# Patient Record
Sex: MALE | Race: WHITE | NOT HISPANIC OR LATINO | URBAN - NONMETROPOLITAN AREA
[De-identification: names, ages, dates, MRNs, and addresses within clinical notes are randomized per-mention and may not be internally consistent; named-entity substitution may affect disease eponyms.]

---

## 2022-10-10 ENCOUNTER — LAB OUTSIDE AN ENCOUNTER (OUTPATIENT)
Dept: URBAN - NONMETROPOLITAN AREA CLINIC 2 | Facility: CLINIC | Age: 72
End: 2022-10-10

## 2022-10-10 ENCOUNTER — WEB ENCOUNTER (OUTPATIENT)
Dept: URBAN - NONMETROPOLITAN AREA CLINIC 2 | Facility: CLINIC | Age: 72
End: 2022-10-10

## 2022-10-10 ENCOUNTER — OFFICE VISIT (OUTPATIENT)
Dept: URBAN - NONMETROPOLITAN AREA CLINIC 2 | Facility: CLINIC | Age: 72
End: 2022-10-10
Payer: MEDICARE

## 2022-10-10 VITALS
WEIGHT: 220 LBS | HEIGHT: 76 IN | DIASTOLIC BLOOD PRESSURE: 87 MMHG | TEMPERATURE: 97.8 F | BODY MASS INDEX: 26.79 KG/M2 | SYSTOLIC BLOOD PRESSURE: 138 MMHG | HEART RATE: 69 BPM

## 2022-10-10 DIAGNOSIS — Z12.11 COLON CANCER SCREENING: ICD-10-CM

## 2022-10-10 DIAGNOSIS — R19.4 CHANGE IN BOWEL HABIT: ICD-10-CM

## 2022-10-10 DIAGNOSIS — K61.1 RECTAL ABSCESS: ICD-10-CM

## 2022-10-10 PROBLEM — 129851009: Status: ACTIVE | Noted: 2022-10-10

## 2022-10-10 PROBLEM — 197166005: Status: ACTIVE | Noted: 2022-10-10

## 2022-10-10 PROBLEM — 305058001: Status: ACTIVE | Noted: 2022-10-10

## 2022-10-10 PROCEDURE — 99204 OFFICE O/P NEW MOD 45 MIN: CPT | Performed by: NURSE PRACTITIONER

## 2022-10-10 RX ORDER — LINACLOTIDE 145 UG/1
1 CAPSULE AT LEAST 30 MINUTES BEFORE THE FIRST MEAL OF THE DAY ON AN EMPTY STOMACH CAPSULE, GELATIN COATED ORAL ONCE A DAY
Qty: 90 CAPSULE | Refills: 3 | OUTPATIENT
Start: 2022-10-10 | End: 2023-10-05

## 2022-10-10 NOTE — PHYSICAL EXAM CHEST:
breathing is unlabored without accessory muscle use. , normal breath sounds.
no chest pain, no cough, and no shortness of breath.

## 2022-10-10 NOTE — HPI-TODAY'S VISIT:
10/10/2022 Mr. Starr is a 71-year-old male referred to us by Dr. Rosa for consultation of colorectal cancer screening and change in bowel habits.  A copy this note be sent to the referring physician.  Between 5 and 10 years ago he had a recurrent rectal abscess.  He had to have I&D x2 by Dr. Perdomo.  He states that this bothers him at times.  Recently he has had increased constipation with hard stools.  He is on Citrucel, MiraLAX, stool softeners, and he has to take a Dulcolax once a week or so.  He states his stools are very hard.  He agrees he can drink more water.  His last colonoscopy was done November 2014 by Dr. Ratliff and normal.  Today he is here for evaluation of change in bowel habits.  He agrees to low-dose Linzess, we will schedule repeat colonoscopy to evaluate his change in bowel habits and follow-up pending results.  MB

## 2022-12-08 ENCOUNTER — OFFICE VISIT (OUTPATIENT)
Dept: URBAN - NONMETROPOLITAN AREA SURGERY CENTER 1 | Facility: SURGERY CENTER | Age: 72
End: 2022-12-08

## 2022-12-08 ENCOUNTER — CLAIMS CREATED FROM THE CLAIM WINDOW (OUTPATIENT)
Dept: URBAN - NONMETROPOLITAN AREA SURGERY CENTER 1 | Facility: SURGERY CENTER | Age: 72
End: 2022-12-08
Payer: MEDICARE

## 2022-12-08 DIAGNOSIS — R19.4 ALTERATION IN BOWEL ELIMINATION: ICD-10-CM

## 2022-12-08 PROCEDURE — 45378 DIAGNOSTIC COLONOSCOPY: CPT | Performed by: INTERNAL MEDICINE

## 2022-12-08 PROCEDURE — G8907 PT DOC NO EVENTS ON DISCHARG: HCPCS | Performed by: INTERNAL MEDICINE

## 2024-04-23 ENCOUNTER — OV EP (OUTPATIENT)
Dept: URBAN - NONMETROPOLITAN AREA CLINIC 2 | Facility: CLINIC | Age: 74
End: 2024-04-23
Payer: MEDICARE

## 2024-04-23 ENCOUNTER — LAB (OUTPATIENT)
Dept: URBAN - NONMETROPOLITAN AREA CLINIC 2 | Facility: CLINIC | Age: 74
End: 2024-04-23

## 2024-04-23 VITALS
SYSTOLIC BLOOD PRESSURE: 153 MMHG | DIASTOLIC BLOOD PRESSURE: 89 MMHG | HEART RATE: 69 BPM | TEMPERATURE: 98 F | BODY MASS INDEX: 28.01 KG/M2 | HEIGHT: 76 IN | WEIGHT: 230 LBS

## 2024-04-23 DIAGNOSIS — K61.1 RECTAL ABSCESS: ICD-10-CM

## 2024-04-23 DIAGNOSIS — R93.5 ABNORMAL ABDOMINAL CT SCAN: ICD-10-CM

## 2024-04-23 DIAGNOSIS — Z12.11 COLON CANCER SCREENING: ICD-10-CM

## 2024-04-23 DIAGNOSIS — R19.4 CHANGE IN BOWEL HABIT: ICD-10-CM

## 2024-04-23 DIAGNOSIS — K59.09 CHRONIC CONSTIPATION: ICD-10-CM

## 2024-04-23 PROBLEM — 236069009: Status: ACTIVE | Noted: 2024-04-23

## 2024-04-23 PROBLEM — 15634181000119107: Status: ACTIVE | Noted: 2024-04-23

## 2024-04-23 PROCEDURE — 99214 OFFICE O/P EST MOD 30 MIN: CPT | Performed by: INTERNAL MEDICINE

## 2024-04-23 RX ORDER — ATORVASTATIN CALCIUM 40 MG/1
1 TABLET TABLET, FILM COATED ORAL ONCE A DAY
Status: ACTIVE | COMMUNITY

## 2024-04-23 RX ORDER — PLECANATIDE 3 MG/1
1 TABLET TABLET ORAL ONCE A DAY
Qty: 90 | Refills: 3 | OUTPATIENT
Start: 2024-04-23 | End: 2025-04-18

## 2024-04-23 RX ORDER — IRBESARTAN 300 MG/1
1 TABLET TABLET, FILM COATED ORAL ONCE A DAY
Status: ACTIVE | COMMUNITY

## 2024-04-23 RX ORDER — POLYETHYLENE GLYCOL 3350, SODIUM SULFATE, SODIUM CHLORIDE, POTASSIUM CHLORIDE, ASCORBIC ACID, SODIUM ASCORBATE 140-9-5.2G
177ML KIT ORAL ONCE
Qty: 280 GRAM | Refills: 0 | OUTPATIENT
Start: 2024-04-23 | End: 2024-04-24

## 2024-04-23 RX ORDER — ROPINIROLE 3 MG/1
1 TABLET 1 TO 3 HOURS BEFORE BEDTIME TABLET, FILM COATED ORAL ONCE A DAY
Status: ACTIVE | COMMUNITY

## 2024-05-08 ENCOUNTER — OFFICE VISIT (OUTPATIENT)
Dept: URBAN - NONMETROPOLITAN AREA SURGERY CENTER 1 | Facility: SURGERY CENTER | Age: 74
End: 2024-05-08
Payer: MEDICARE

## 2024-05-08 DIAGNOSIS — R93.3 ABN FINDINGS-GI TRACT: ICD-10-CM

## 2024-05-08 DIAGNOSIS — Z12.11 ENCOUNTER SCREENING FOR MALIGNANT NEOPLASM OF COLON: ICD-10-CM

## 2024-05-08 PROCEDURE — 45378 DIAGNOSTIC COLONOSCOPY: CPT | Performed by: INTERNAL MEDICINE

## 2024-05-08 PROCEDURE — 00812 ANES LWR INTST SCR COLSC: CPT | Performed by: NURSE ANESTHETIST, CERTIFIED REGISTERED

## 2024-05-08 PROCEDURE — G8907 PT DOC NO EVENTS ON DISCHARG: HCPCS | Performed by: INTERNAL MEDICINE

## 2024-05-08 RX ORDER — IRBESARTAN 300 MG/1
1 TABLET TABLET, FILM COATED ORAL ONCE A DAY
Status: ACTIVE | COMMUNITY

## 2024-05-08 RX ORDER — ROPINIROLE 3 MG/1
1 TABLET 1 TO 3 HOURS BEFORE BEDTIME TABLET, FILM COATED ORAL ONCE A DAY
Status: ACTIVE | COMMUNITY

## 2024-05-08 RX ORDER — PLECANATIDE 3 MG/1
1 TABLET TABLET ORAL ONCE A DAY
Qty: 90 | Refills: 3 | Status: ACTIVE | COMMUNITY
Start: 2024-04-23 | End: 2025-04-18

## 2024-05-08 RX ORDER — ATORVASTATIN CALCIUM 40 MG/1
1 TABLET TABLET, FILM COATED ORAL ONCE A DAY
Status: ACTIVE | COMMUNITY

## 2024-09-30 NOTE — HPI-TODAY'S VISIT:
10/10/2022 Mr. Starr is a 71-year-old male referred to us by Dr. Rosa for consultation of colorectal cancer screening and change in bowel habits.  A copy this note be sent to the referring physician.  Between 5 and 10 years ago he had a recurrent rectal abscess.  He had to have I&D x2 by Dr. Perdomo.  He states that this bothers him at times.  Recently he has had increased constipation with hard stools.  He is on Citrucel, MiraLAX, stool softeners, and he has to take a Dulcolax once a week or so.  He states his stools are very hard.  He agrees he can drink more water.  His last colonoscopy was done November 2014 by Dr. Ratliff and normal.  Today he is here for evaluation of change in bowel habits.  He agrees to low-dose Linzess, we will schedule repeat colonoscopy to evaluate his change in bowel habits and follow-up pending results.  MB 4/23/2024 The patient presents today for follow-up of his constipation.  Since her last visit, he did have a colonoscopy that was fairly normal.  He has had increased constipation with hard stools.  He went to the emergency room because he was so constipated.  There, he had a CT scan that did show some rectal wall thickening, and they recommended a repeat colonoscopy.  Today, we have discussed his bowel regimen.  We tried to start him on Linzess, but his insurance would not pay for this.  Today, we have discussed Trulance.  We are also going to start him on a bowel regimen including MiraLAX in the morning with Metamucil and Colace at night.  He can use milk of magnesia and Dulcolax as needed.  We are going to schedule him for repeat colonoscopy given his abnormal CT findings.  We will see him back in the office after his colonoscopy for further evaluation. Good

## 2024-10-21 ENCOUNTER — OFFICE VISIT (OUTPATIENT)
Dept: URBAN - NONMETROPOLITAN AREA CLINIC 2 | Facility: CLINIC | Age: 74
End: 2024-10-21